# Patient Record
Sex: MALE | Race: WHITE
[De-identification: names, ages, dates, MRNs, and addresses within clinical notes are randomized per-mention and may not be internally consistent; named-entity substitution may affect disease eponyms.]

---

## 2022-07-08 ENCOUNTER — HOSPITAL ENCOUNTER (EMERGENCY)
Dept: HOSPITAL 26 - MED | Age: 42
Discharge: HOME | End: 2022-07-08
Payer: COMMERCIAL

## 2022-07-08 VITALS — DIASTOLIC BLOOD PRESSURE: 70 MMHG | SYSTOLIC BLOOD PRESSURE: 116 MMHG

## 2022-07-08 VITALS — BODY MASS INDEX: 32.89 KG/M2 | HEIGHT: 68 IN | WEIGHT: 217 LBS

## 2022-07-08 DIAGNOSIS — K64.9: Primary | ICD-10-CM

## 2022-07-08 NOTE — NUR
Patient discharged with v/s stable. Written and verbal after care instructions 
given and explained. 

Patient alert, oriented and verbalized understanding of instructions. 
Ambulatory with steady gait. All questions addressed prior to discharge. ID 
band removed. Patient advised to follow up with PMD. Rx of BACITRACIN ZINC AND 
HYDROCORTISONE ACETATE given. Patient educated on indication of medication 
including possible reaction and side effects. Opportunity to ask questions 
provided and answered.

## 2022-10-23 ENCOUNTER — HOSPITAL ENCOUNTER (EMERGENCY)
Dept: HOSPITAL 26 - MED | Age: 42
Discharge: HOME | End: 2022-10-23
Payer: COMMERCIAL

## 2022-10-23 VITALS — SYSTOLIC BLOOD PRESSURE: 122 MMHG | DIASTOLIC BLOOD PRESSURE: 67 MMHG

## 2022-10-23 VITALS — SYSTOLIC BLOOD PRESSURE: 152 MMHG | DIASTOLIC BLOOD PRESSURE: 62 MMHG

## 2022-10-23 VITALS — HEIGHT: 68 IN | BODY MASS INDEX: 35.01 KG/M2 | WEIGHT: 231 LBS

## 2022-10-23 DIAGNOSIS — Z71.6: ICD-10-CM

## 2022-10-23 DIAGNOSIS — J40: Primary | ICD-10-CM

## 2022-10-23 DIAGNOSIS — F17.210: ICD-10-CM

## 2022-10-23 LAB
ALBUMIN FLD-MCNC: 3.9 G/DL (ref 3.4–5)
ANION GAP SERPL CALCULATED.3IONS-SCNC: 13 MMOL/L (ref 8–16)
AST SERPL-CCNC: 31 U/L (ref 15–37)
BASOPHILS # BLD AUTO: 0 K/UL (ref 0–0.22)
BASOPHILS NFR BLD AUTO: 0.4 % (ref 0–2)
BILIRUB SERPL-MCNC: 0.6 MG/DL (ref 0–1)
BUN SERPL-MCNC: 11 MG/DL (ref 7–18)
CHLORIDE SERPL-SCNC: 105 MMOL/L (ref 98–107)
CO2 SERPL-SCNC: 25.8 MMOL/L (ref 21–32)
CREAT SERPL-MCNC: 1.1 MG/DL (ref 0.6–1.3)
EOSINOPHIL # BLD AUTO: 0.1 K/UL (ref 0–0.4)
EOSINOPHIL NFR BLD AUTO: 0.6 % (ref 0–4)
ERYTHROCYTE [DISTWIDTH] IN BLOOD BY AUTOMATED COUNT: 13.9 % (ref 11.6–13.7)
GFR SERPL CREATININE-BSD FRML MDRD: 94 ML/MIN (ref 90–?)
GLUCOSE SERPL-MCNC: 115 MG/DL (ref 74–106)
HCT VFR BLD AUTO: 43.6 % (ref 36–52)
HGB BLD-MCNC: 15.6 G/DL (ref 12–18)
LYMPHOCYTES # BLD AUTO: 2 K/UL (ref 2–11.5)
LYMPHOCYTES NFR BLD AUTO: 22.7 % (ref 20.5–51.1)
MCH RBC QN AUTO: 32 PG (ref 27–31)
MCHC RBC AUTO-ENTMCNC: 36 G/DL (ref 33–37)
MCV RBC AUTO: 90.3 FL (ref 80–94)
MONOCYTES # BLD AUTO: 0.7 K/UL (ref 0.8–1)
MONOCYTES NFR BLD AUTO: 7.6 % (ref 1.7–9.3)
NEUTROPHILS # BLD AUTO: 6 K/UL (ref 1.8–7.7)
NEUTROPHILS NFR BLD AUTO: 68.7 % (ref 42.2–75.2)
PLATELET # BLD AUTO: 205 K/UL (ref 140–450)
POTASSIUM SERPL-SCNC: 3.8 MMOL/L (ref 3.5–5.1)
RBC # BLD AUTO: 4.83 MIL/UL (ref 4.2–6.1)
SODIUM SERPL-SCNC: 140 MMOL/L (ref 136–145)
WBC # BLD AUTO: 8.8 K/UL (ref 4.8–10.8)

## 2022-10-23 PROCEDURE — 85025 COMPLETE CBC W/AUTO DIFF WBC: CPT

## 2022-10-23 PROCEDURE — 71045 X-RAY EXAM CHEST 1 VIEW: CPT

## 2022-10-23 PROCEDURE — 99285 EMERGENCY DEPT VISIT HI MDM: CPT

## 2022-10-23 PROCEDURE — 96374 THER/PROPH/DIAG INJ IV PUSH: CPT

## 2022-10-23 PROCEDURE — 80053 COMPREHEN METABOLIC PANEL: CPT

## 2022-10-23 PROCEDURE — 36415 COLL VENOUS BLD VENIPUNCTURE: CPT

## 2022-10-23 PROCEDURE — 93005 ELECTROCARDIOGRAM TRACING: CPT

## 2022-10-23 PROCEDURE — 85379 FIBRIN DEGRADATION QUANT: CPT

## 2022-10-23 PROCEDURE — 84484 ASSAY OF TROPONIN QUANT: CPT

## 2022-10-23 NOTE — NUR
C/O COUGH AND SOB X1WEEK .  SPO2 99% RA.  DENIES SOB OR CP AT THIS TIME.  PT IS 
A&OX4 SITTING UP IN CHAIR SPEAKING IN FULL SENTENCES.  SKIN WARM DRY AND 
INTACT.  SECOND TROP DRAWN , PENDING RESULTS.  



NKDA

NO HX

## 2022-10-23 NOTE — NUR
42/M PRESENTS TO ED WITH C/O CHEST PAIN RADIATING TO BACK, COUGH AND SOB X1 
WEEK. PATIENT REPORTS HE FELT SYMPTOMS WORSENING AFTER WORKING OUT TODAY, 
PATIENT STATES NEGATIVE AT HOME COVID TEST TODAY. DENIES INJURY OR TRAUMA BUT 
STATES AT WORK IS REQUIRED TO MOVE BOXES, PATIENT DENIES N/V/D, FEVERS, CHILLS, 
DENIES RECENT SICK CONTACTS.

## 2023-08-19 ENCOUNTER — HOSPITAL ENCOUNTER (EMERGENCY)
Dept: HOSPITAL 26 - MED | Age: 43
LOS: 1 days | Discharge: HOME | End: 2023-08-20
Payer: COMMERCIAL

## 2023-08-19 VITALS — BODY MASS INDEX: 35.79 KG/M2 | WEIGHT: 228 LBS | HEIGHT: 67 IN

## 2023-08-19 VITALS
SYSTOLIC BLOOD PRESSURE: 139 MMHG | OXYGEN SATURATION: 97 % | RESPIRATION RATE: 16 BRPM | DIASTOLIC BLOOD PRESSURE: 77 MMHG | HEART RATE: 90 BPM | TEMPERATURE: 97.6 F

## 2023-08-19 DIAGNOSIS — R10.9: ICD-10-CM

## 2023-08-19 DIAGNOSIS — Z79.899: ICD-10-CM

## 2023-08-19 DIAGNOSIS — N18.9: Primary | ICD-10-CM

## 2023-08-19 LAB
ALBUMIN FLD-MCNC: 3.8 G/DL (ref 3.4–5)
ALP SERPL-CCNC: 76 U/L (ref 50–136)
ALT SERPL-CCNC: 42 U/L (ref 12–78)
ANION GAP SERPL CALCULATED.3IONS-SCNC: 13.5 MMOL/L (ref 8–16)
APPEARANCE UR: CLEAR
AST SERPL-CCNC: 33 U/L (ref 15–37)
BASOPHILS # BLD AUTO: 0 K/UL (ref 0–0.22)
BASOPHILS NFR BLD AUTO: 0.4 % (ref 0–2)
BILIRUB SERPL-MCNC: 0.5 MG/DL (ref 0–1)
BILIRUB UR QL STRIP: NEGATIVE
BUN SERPL-MCNC: 13 MG/DL (ref 7–18)
CALCIUM SPEC-MCNC: 8.8 MG/DL (ref 8.5–10.1)
CHLORIDE SERPL-SCNC: 103 MMOL/L (ref 98–107)
CO2 SERPL-SCNC: 27.4 MMOL/L (ref 21–32)
COLOR UR: YELLOW
CREAT SERPL-MCNC: 1.4 MG/DL (ref 0.6–1.3)
EOSINOPHIL # BLD AUTO: 0.1 K/UL (ref 0–0.4)
EOSINOPHIL NFR BLD AUTO: 0.6 % (ref 0–4)
ERYTHROCYTE [DISTWIDTH] IN BLOOD BY AUTOMATED COUNT: 14.1 % (ref 11.6–13.7)
GFR SERPL CREATININE-BSD FRML MDRD: 71 ML/MIN (ref 90–?)
GLUCOSE SERPL-MCNC: 91 MG/DL (ref 74–106)
GLUCOSE UR STRIP-MCNC: NEGATIVE MG/DL
HCT VFR BLD AUTO: 40.2 % (ref 36–52)
HGB BLD-MCNC: 14 G/DL (ref 12–18)
HGB UR QL STRIP: NEGATIVE
LEUKOCYTE ESTERASE UR QL STRIP: NEGATIVE
LYMPHOCYTES # BLD AUTO: 2.6 K/UL (ref 2–11.5)
LYMPHOCYTES NFR BLD AUTO: 25.7 % (ref 20.5–51.1)
MCH RBC QN AUTO: 32 PG (ref 27–31)
MCHC RBC AUTO-ENTMCNC: 35 G/DL (ref 33–37)
MCV RBC AUTO: 91.9 FL (ref 80–94)
MONOCYTES # BLD AUTO: 0.8 K/UL (ref 0.8–1)
MONOCYTES NFR BLD AUTO: 7.7 % (ref 1.7–9.3)
NEUTROPHILS # BLD AUTO: 6.7 K/UL (ref 1.8–7.7)
NEUTROPHILS NFR BLD AUTO: 65.6 % (ref 42.2–75.2)
NITRITE UR QL STRIP: NEGATIVE
PH UR STRIP: 6.5 [PH] (ref 5–9)
PLATELET # BLD AUTO: 188 K/UL (ref 140–450)
POTASSIUM SERPL-SCNC: 3.9 MMOL/L (ref 3.5–5.1)
PROT SERPL-MCNC: 7.4 G/DL (ref 6.4–8.2)
PROT UR QL STRIP: NEGATIVE
RBC # BLD AUTO: 4.37 MIL/UL (ref 4.2–6.1)
SODIUM SERPL-SCNC: 140 MMOL/L (ref 136–145)
SP GR UR STRIP: <=1.005 (ref 1–1.03)
UROBILINOGEN UR STRIP-MCNC: 0.2 EU/DL (ref 0.2–1)
WBC # BLD AUTO: 10.2 K/UL (ref 4.8–10.8)

## 2023-08-19 PROCEDURE — 36415 COLL VENOUS BLD VENIPUNCTURE: CPT

## 2023-08-19 PROCEDURE — 85025 COMPLETE CBC W/AUTO DIFF WBC: CPT

## 2023-08-19 PROCEDURE — 99285 EMERGENCY DEPT VISIT HI MDM: CPT

## 2023-08-19 PROCEDURE — 74176 CT ABD & PELVIS W/O CONTRAST: CPT

## 2023-08-19 PROCEDURE — 96374 THER/PROPH/DIAG INJ IV PUSH: CPT

## 2023-08-19 PROCEDURE — 96361 HYDRATE IV INFUSION ADD-ON: CPT

## 2023-08-19 PROCEDURE — 80053 COMPREHEN METABOLIC PANEL: CPT

## 2023-08-19 PROCEDURE — 81003 URINALYSIS AUTO W/O SCOPE: CPT
